# Patient Record
Sex: FEMALE | Race: WHITE
[De-identification: names, ages, dates, MRNs, and addresses within clinical notes are randomized per-mention and may not be internally consistent; named-entity substitution may affect disease eponyms.]

---

## 2019-09-11 ENCOUNTER — HOSPITAL ENCOUNTER (INPATIENT)
Dept: HOSPITAL 74 - YASAS | Age: 29
LOS: 3 days | Discharge: HOME | DRG: 773 | End: 2019-09-14
Attending: SURGERY | Admitting: SURGERY
Payer: COMMERCIAL

## 2019-09-11 VITALS — BODY MASS INDEX: 19.1 KG/M2

## 2019-09-11 DIAGNOSIS — Z86.69: ICD-10-CM

## 2019-09-11 DIAGNOSIS — F11.23: ICD-10-CM

## 2019-09-11 DIAGNOSIS — F13.230: ICD-10-CM

## 2019-09-11 DIAGNOSIS — F10.230: Primary | ICD-10-CM

## 2019-09-11 DIAGNOSIS — F17.210: ICD-10-CM

## 2019-09-11 DIAGNOSIS — F14.20: ICD-10-CM

## 2019-09-11 DIAGNOSIS — F34.1: ICD-10-CM

## 2019-09-11 PROCEDURE — HZ2ZZZZ DETOXIFICATION SERVICES FOR SUBSTANCE ABUSE TREATMENT: ICD-10-PCS | Performed by: ALLERGY & IMMUNOLOGY

## 2019-09-11 RX ADMIN — NICOTINE SCH: 21 PATCH TRANSDERMAL at 22:24

## 2019-09-11 RX ADMIN — Medication SCH: at 22:26

## 2019-09-11 NOTE — HP
<Mary Mayers - Last Filed: 19 18:54>





CIWA Score


Nausea/Vomitin-Mild Nausea/No Vomiting





- Admission Criteria


[facility] Guidelines: Admission for Medically Managed Detox: 


Requires at least one of the followin. CIWA greater than 12


2. Seizures within the past 24 hours


3. Delirium tremens within the past 24 hours


4. Hallucinations within the past 24 hours


5. Acute intervention needed for co  occurring medical disorder


6. Acute intervention needed for co  occurring psychiatric disorder


7. Severe withdrawal that cannot be handled at a lower level of care (continued


    vomiting, continued diarrhea, abnormal vital signs) requiring intravenous


    medication and/or fluids


8. Pregnancy








Admission ROS [facility]





- Hospitals in [place]


Chief Complaint: 








heroin detox








Allergies/Adverse Reactions: 


 Allergies











Allergy/AdvReac Type Severity Reaction Status Date / Time


 


No Known Allergies Allergy   Verified 19 17:14











History of Present Illness: 





poor historian. sedated.





Patient is a 30 yo F with no known PMHx, here for heroin detox. 


2-3 grams a day via IV.


Started heroin 2 years ago.


Was in a methadone program 5 months ago. 120mg.


never overdosed. carries a narcan pen with her.





uses cocaine every day via IV. can't quantify.





Drinks 2-3 pints of vodka a day.


Last drink at midnight.





unemployed. homeless.














- Ebola screening


Have you traveled outside of the country in the last 21 days: No (N)


Have you had contact with anyone from an Ebola affected area: No


Do you have a fever: No





Patient History





- Patient Medical History


Hx Anemia: No


Hx Asthma: No


Hx Chronic Obstructive Pulmonary Disease (COPD): No


Hx Cancer: No


Hx Cardiac Disorders: No


Hx Congestive Heart Failure: No


Hx Hypertension: No


Hx Hypercholesterolemia: No


Hx Pacemaker: No


HX Cerebrovascular Accident: No


Hx Seizures: Yes (etoh and drug related seizures/ last in )


Hx Dementia: No


Hx Diabetes: No


Hx Gastrointestinal Disorders: No


Hx Liver Disease: No


Hx Genitourinary Disorders: No


Hx Sexually Transmitted Disorders: No


Hx Renal Disease (ESRD): No


Hx Thyroid Disease: No


Hx Human Immunodeficiency Virus (HIV): No (NEGATIVE HX)


Hx Hepatitis C: No


Hx Depression: Yes


Hx Suicide Attempt: No


Hx Bipolar Disorder: No


Hx Schizophrenia: No





- Patient Surgical History


Past Surgical History: No





- PPD History


Date: 16


Results: 0 mm





- Reproductive History


Last Menstrual Period: 10/20/16





- Smoking Cessation


Smoking history: Current every day smoker


Have you smoked in the past 12 months: Yes


Aproximately how many cigarettes per day: 20


Cigars Per Day: 0


Hx Chewing Tobacco Use: No





- Substances abused


  ** Heroin


Substance route: Injection


Frequency: Daily


Amount used: 1 BRICK


Age of first use: 26


Date of last use: 19





  ** Cocaine


Substance route: Injection


Frequency: Daily


Amount used: $1000


Age of first use: 26


Date of last use: 19





  ** Alcohol


Substance route: Oral


Frequency: Daily


Amount used: 1 PINT OF HENESSEY AND PATRON


Age of first use: 26


Date of last use: 19





Family Disease History





- Family Disease History


Family Disease History: CA: Grandparent (GM-BREAST CA IN REMISSION), Respiratory

: Mother (ASTHMA), Other: Sister (HTN)





Admission Physical Exam [facility]





- Vital Signs


Vital Signs: 


 Vital Signs - 24 hr











  19





  17:09


 


Temperature 97.1 F L


 


Pulse Rate 58 L


 


Respiratory 20





Rate 


 


Blood Pressure 135/60














Breathalyzer





- Breathalyzer


Breathalyzer: 0





Urine Drug Screen





- Test Device


Lot number: [id number]


Expiration date: 21





- Control


Is test valid?: Yes





- Results


Drug screen NEGATIVE: No


Urine drug screen results: PALLAVI-Cocaine, FEN-Fentanyl, MOP-Opiates, OXY-Oxycodone





<Randell Castillo - Last Filed: 19 20:27>





COWS





- Scale


Resting Pulse: 0= [place] 80 or Below


Sweatin= Chills/Flushing


Restless Observation: 1= Difficult to Sit Still


Pupil Size: 1= Pupils >than Normal


Bone or Joint Aches: 2= Severe Diffuse Aches


Runny Nose/ Eye Tearin= Runny Nose/Eyes


GI Upset > 30mins: 1= Stomach Cramp


Tremor Observation: 2= Slight Tremor Visible


Yawning Observation: 1= 1-2x During Session


Anxiety or Irritability: 2=Irritable/Anxious


Goose Flesh Skin: 5=Prominent Piloerection


COWS Score: 18





CIWA Score


Nausea/Vomitin


Muscle Tremors: 4-Moderate,w/Arms Extend


Anxiety: 3


Agitation: 3


Paroxysmal Sweats: 1-Minimal Palms Moist


Orientation: 2-Disoriented Date<2 days


Tacttile Disturbances: 1-Very Mild Itch/Numbness


Auditory Disturbances: 1-Very Mild


Visual Disturbances: 2-Mild Sensitivity


Headache: 3-Moderate


CIWA-Ar Total Score: 22





- Admission Criteria


[facility] Guidelines: Admission for Medically Managed Detox: 


Requires at least one of the followin. CIWA greater than 12


2. Seizures within the past 24 hours


3. Delirium tremens within the past 24 hours


4. Hallucinations within the past 24 hours


5. Acute intervention needed for co  occurring medical disorder


6. Acute intervention needed for co  occurring psychiatric disorder


7. Severe withdrawal that cannot be handled at a lower level of care (continued


    vomiting, continued diarrhea, abnormal vital signs) requiring intravenous


    medication and/or fluids


8. Pregnancy





Patient presents the following: CIWA greater than 12


Admission Criteria Met: Admission criteria met





Admission ROS [facility]





- HPI


Chief Complaint: 


margarette desires detox


History of Present Illness: 


reports drinking henessey and patron most days of week





- Review of Systems


Constitutional: Malaise, Night Sweats, Unexplained wgt Loss


EENT: reports: See HPI, Tearing


Respiratory: reports: No Symptoms reported


Cardiac: reports: No Symptoms Reported


GI: reports: Nausea, Abdominal cramping


: reports: No Symptoms Reported


Musculoskeletal: reports: Muscle Pain


Integumentary: reports: No Symptoms Reported


Neuro: reports: No Symptoms reported


Endocrine: reports: No Symptoms Reported


Hematology: reports: No Symptoms Reported


Psychiatric: reports: Anxious





Patient History





- Patient Medical History


Hx Anemia: Yes


Hx Asthma: No


Hx Chronic Obstructive Pulmonary Disease (COPD): No


Hx Cancer: No


Hx Cardiac Disorders: No


Hx Congestive Heart Failure: No


Hx Hypertension: No


Hx Hypercholesterolemia: No


Hx Pacemaker: No


HX Cerebrovascular Accident: No


Hx Seizures: Yes


Hx Dementia: No


Hx Diabetes: No


Hx Gastrointestinal Disorders: No


Hx Liver Disease: No


Hx Genitourinary Disorders: No


Hx Sexually Transmitted Disorders: No


Hx Renal Disease (ESRD): No


Hx Thyroid Disease: No


Hx Human Immunodeficiency Virus (HIV): No


Hx Hepatitis C: No


Hx Depression: Yes


Hx Suicide Attempt: No


Hx Bipolar Disorder: No


Hx Schizophrenia: No





- Patient Surgical History


Other Surgical History: ho abcess drainage 





- PPD History


Previous Implant?: Yes





- Reproductive History


Patient is a Female of Child Bearing Age (11 -55 yrs old): Yes


Last Menstrual Period: 19





- Smoking Cessation


Smoking history: Current every day smoker


Have you smoked in the past 12 months: Yes


Initiated information on smoking cessation: Yes


'Breaking Loose' booklet given: 19





- Substance & Tx. History


Hx Alcohol Use: Yes


Hx Substance Use: Yes





- Substances abused





  ** Alcohol


Frequency: 3-6 times per week





Admission Physical Exam [facility]





- Vital Signs


Vital Signs: 


 Vital Signs - 24 hr











  19





  17:09


 


Temperature 97.1 F L


 


Pulse Rate 58 L


 


Respiratory 20





Rate 


 


Blood Pressure 135/60














- Physical


General Appearance: Yes: Disheveled, Irritable, Anxious


HEENTM: Yes: EOMI, Normocephalic


Respiratory: Yes: Within Normal Limits, Chest Non-Tender, Lungs Clear


Neck: Yes: No masses,lesions,Nodules


Breast: Yes: Breast Exam Deferred


Cardiology: Yes: Regular Rate, S1, S2


Abdominal: Yes: Normal Bowel Sounds, Non Tender


Genitourinary: Yes: Within Normal Limits


Back: Yes: Within Normal Limits, Normal Inspection


Musculoskeletal: Yes: Within Normal Limits, full range of Motion, Gait Steady


Extremities: Yes: Normal Capillary Refill, Normal Inspection, Normal Range of 

Motion, Non-Tender


Neurological: Yes: CNs II-XII NML intact, Alert, Motor Strength 5/5, Normal 

Response


Integumentary: Yes: Track Marks, Other (diffuse bl ue scarring)


Lymphatic: Yes: Within Normal Limits





- Diagnostic


(1) Alcohol dependence with uncomplicated withdrawal


Current Visit: Yes   Status: Acute   





(2) Opioid dependence with withdrawal


Current Visit: Yes   Status: Acute   





(3) Depression (emotion)


Current Visit: Yes   Status: Suspected   


Qualifiers: 


   Depression Type: dysthymia   Qualified Code(s): F34.1 - Dysthymic disorder   


Comment: insomnia   





(4) Drug withdrawal seizure


Current Visit: Yes   Status: Suspected   





Cleared for Admission [facility]





- Detox or Rehab


[facility] Level of Care: Medically Supervised





Inpatient Rehab Admission





- Rehab Decision to Admit


Inpatient rehab admission?: No
[FreeTextEntry1] : my legs bother me

## 2019-09-11 NOTE — PN
Teaching Attending Note


Name of Resident: Mary Mayers





ATTENDING PHYSICIAN STATEMENT





I saw and evaluated the patient.


I reviewed the resident's note and discussed the case with the resident.


I agree with the resident's findings and plan as documented.








SUBJECTIVE:


withdrawal





OBJECTIVE:cows/ciwa meet criteria








ASSESSMENT AND PLAN:


admit to detox


taper per proptoocol





Problem List





- Problems


(1) Alcohol dependence with uncomplicated withdrawal


Code(s): F10.230 - ALCOHOL DEPENDENCE WITH WITHDRAWAL, UNCOMPLICATED   





(2) Opioid dependence with withdrawal


Code(s): F11.23 - OPIOID DEPENDENCE WITH WITHDRAWAL   





(3) Depression (emotion)


Code(s): F32.9 - MAJOR DEPRESSIVE DISORDER, SINGLE EPISODE, UNSPECIFIED   


Qualifiers: 


   Depression Type: dysthymia   Qualified Code(s): F34.1 - Dysthymic disorder   





(4) Drug withdrawal seizure


Code(s): F19.239 - OTH PSYCHOACTIVE SUBSTANCE DEPENDENCE WITH WITHDRAWAL, UNSP; 

R56.9 - UNSPECIFIED CONVULSIONS

## 2019-09-12 LAB
ALBUMIN SERPL-MCNC: 3.1 G/DL (ref 3.4–5)
ALP SERPL-CCNC: 70 U/L (ref 45–117)
ALT SERPL-CCNC: 112 U/L (ref 13–61)
ANION GAP SERPL CALC-SCNC: 7 MMOL/L (ref 8–16)
AST SERPL-CCNC: 91 U/L (ref 15–37)
BILIRUB SERPL-MCNC: 0.4 MG/DL (ref 0.2–1)
BUN SERPL-MCNC: 8.3 MG/DL (ref 7–18)
CALCIUM SERPL-MCNC: 9.1 MG/DL (ref 8.5–10.1)
CHLORIDE SERPL-SCNC: 106 MMOL/L (ref 98–107)
CO2 SERPL-SCNC: 27 MMOL/L (ref 21–32)
CREAT SERPL-MCNC: 0.8 MG/DL (ref 0.55–1.3)
DEPRECATED RDW RBC AUTO: 15.9 % (ref 11.6–15.6)
GLUCOSE SERPL-MCNC: 104 MG/DL (ref 74–106)
HCT VFR BLD CALC: 40 % (ref 32.4–45.2)
HGB BLD-MCNC: 13.1 GM/DL (ref 10.7–15.3)
MCH RBC QN AUTO: 28.7 PG (ref 25.7–33.7)
MCHC RBC AUTO-ENTMCNC: 32.7 G/DL (ref 32–36)
MCV RBC: 87.6 FL (ref 80–96)
PLATELET # BLD AUTO: 304 K/MM3 (ref 134–434)
PMV BLD: 9.5 FL (ref 7.5–11.1)
POTASSIUM SERPLBLD-SCNC: 4.2 MMOL/L (ref 3.5–5.1)
PROT SERPL-MCNC: 6.8 G/DL (ref 6.4–8.2)
RBC # BLD AUTO: 4.57 M/MM3 (ref 3.6–5.2)
SODIUM SERPL-SCNC: 140 MMOL/L (ref 136–145)
WBC # BLD AUTO: 5.5 K/MM3 (ref 4–10)

## 2019-09-12 RX ADMIN — Medication PRN MG: at 21:25

## 2019-09-12 RX ADMIN — Medication SCH MG: at 21:23

## 2019-09-12 RX ADMIN — Medication SCH TAB: at 10:15

## 2019-09-12 RX ADMIN — NICOTINE SCH: 21 PATCH TRANSDERMAL at 10:15

## 2019-09-12 NOTE — PN
[facility] Progress Note


Note: 





alert,oriented x 3,methadone 30 mg detox first dose ordered for today

## 2019-09-12 NOTE — PN
S CIWA





- CIWA Score


Nausea/Vomitin


Muscle Tremors: 2


Anxiety: 2


Agitation: 2


Paroxysmal Sweats: 1-Minimal Palms Moist


Orientation: 0-Oriented


Tacttile Disturbances: 1-Very Mild Itch/Numbness


Auditory Disturbances: 0-None


Visual Disturbances: 0-None


Headache: 2-Mild


CIWA-Ar Total Score: 12





[facility] COWS





- Scale


Resting Pulse: 0= [place] 80 or Below


Sweatin= Chills/Flushing


Restless Observation: 1= Difficult to Sit Still


Pupil Size: 0= Normal to Room Light


Bone or Joint Aches: 1= Mild Discomfort


Runny Nose/ Eye Tearin= Nasal Congestion


GI Upset > 30mins: 1= Stomach Cramp


Tremor Observation of Outstretched Hands: 2= Slight Tremor Visible


Yawning Observation: 1= 1-2x During Session


Anxiety or Irritability: 2=Irritable/Anxious


Goose Flesh Skin: 0=Smooth Skin


COWS Score: 10





[facility]HS Progress Note (SOAP)


Subjective: 





alert,irritable,anxious,interrupted sleep,pain in the body and back


Objective: 





19 09:43


 Vital Signs











Temperature  97.9 F   19 09:15


 


Pulse Rate  70   19 09:15


 


Respiratory Rate  18   19 09:15


 


Blood Pressure  113/57 L  19 09:15


 


O2 Sat by Pulse Oximetry (%)      











19 09:43


 Laboratory Last Values











POC Urine HCG, Qual  Negative   19  18:12    








labs pending


Assessment: 





19 09:43


withdrawal symptom


Plan: 





continue detox methadone and librium regimen

## 2019-09-13 RX ADMIN — NICOTINE SCH: 21 PATCH TRANSDERMAL at 10:31

## 2019-09-13 RX ADMIN — Medication SCH MG: at 22:32

## 2019-09-13 RX ADMIN — Medication PRN MG: at 22:32

## 2019-09-13 RX ADMIN — Medication SCH TAB: at 10:29

## 2019-09-13 NOTE — PN
S CIWA





- CIWA Score


Nausea/Vomitin


Muscle Tremors: 2


Anxiety: 2


Agitation: 2


Paroxysmal Sweats: No Perspiration


Orientation: 0-Oriented


Tacttile Disturbances: 0-None


Auditory Disturbances: 0-None


Visual Disturbances: 0-None


Headache: 2-Mild


CIWA-Ar Total Score: 10





[facility]HS COWS





- Scale


Resting Pulse: 1= [place] 


Sweatin= Chills/Flushing


Restless Observation: 1= Difficult to Sit Still


Pupil Size: 1= Pupils >than Normal


Bone or Joint Aches: 1= Mild Discomfort


Runny Nose/ Eye Tearin= Nasal Congestion


GI Upset > 30mins: 1= Stomach Cramp


Tremor Observation of Outstretched Hands: 1= Tremor Felt, Not Seen


Yawning Observation: 1= 1-2x During Session


Anxiety or Irritability: 2=Irritable/Anxious


Goose Flesh Skin: 0=Smooth Skin


COWS Score: 11





BHS Progress Note (SOAP)


Subjective: 





alert,irritable,anxious,interrupted sleep,pain in the body and back


Objective: 





19 10:48


 Vital Signs











Temperature  98.4 F   19 09:22


 


Pulse Rate  94 H  19 09:22


 


Respiratory Rate  18   19 09:22


 


Blood Pressure  136/63   19 09:22


 


O2 Sat by Pulse Oximetry (%)      








 Laboratory Last Values











WBC  5.5 K/mm3 (4.0-10.0)   19  08:00    


 


RBC  4.57 M/mm3 (3.60-5.2)   19  08:00    


 


Hgb  13.1 GM/dL (10.7-15.3)   19  08:00    


 


Hct  40.0 % (32.4-45.2)   19  08:00    


 


MCV  87.6 fl (80-96)   19  08:00    


 


MCH  28.7 pg (25.7-33.7)   19  08:00    


 


MCHC  32.7 g/dl (32.0-36.0)   19  08:00    


 


RDW  15.9 % (11.6-15.6)  H D 19  08:00    


 


Plt Count  304 K/MM3 (134-434)  D 19  08:00    


 


MPV  9.5 fl (7.5-11.1)   19  08:00    


 


Sodium  140 mmol/L (136-145)   19  08:00    


 


Potassium  4.2 mmol/L (3.5-5.1)   19  08:00    


 


Chloride  106 mmol/L ()   19  08:00    


 


Carbon Dioxide  27 mmol/L (21-32)   19  08:00    


 


Anion Gap  7 MMOL/L (8-16)  L  19  08:00    


 


BUN  8.3 mg/dL (7-18)   19  08:00    


 


Creatinine  0.8 mg/dL (0.55-1.3)   19  08:00    


 


Est GFR (CKD-EPI)AfAm  116.28   19  08:00    


 


Est GFR (CKD-EPI)NonAf  100.33   19  08:00    


 


Random Glucose  104 mg/dL ()   19  08:00    


 


Calcium  9.1 mg/dL (8.5-10.1)   19  08:00    


 


Total Bilirubin  0.4 mg/dL (0.2-1)   19  08:00    


 


AST  91 U/L (15-37)  H  19  08:00    


 


ALT  112 U/L (13-61)  H  19  08:00    


 


Alkaline Phosphatase  70 U/L ()   19  08:00    


 


Total Protein  6.8 g/dl (6.4-8.2)   19  08:00    


 


Albumin  3.1 g/dl (3.4-5.0)  L  19  08:00    


 


POC Urine HCG, Qual  Negative   19  18:12    


 


RPR Titer  Nonreactive  (NONREACTIVE)   19  08:00    











Assessment: 





19 10:49


withdrawal symptom


Plan: 





continue detox methadone and librium regimen

## 2019-09-14 VITALS — DIASTOLIC BLOOD PRESSURE: 59 MMHG | SYSTOLIC BLOOD PRESSURE: 108 MMHG | HEART RATE: 65 BPM | TEMPERATURE: 98.5 F

## 2019-09-14 RX ADMIN — NICOTINE SCH: 21 PATCH TRANSDERMAL at 10:21

## 2019-09-14 RX ADMIN — Medication SCH TAB: at 10:21

## 2019-09-14 NOTE — DS
[facility] Detox Discharge Summary


Admission Date: 


09/11/19





Discharge Date: 09/14/19 (Left AMA)





- History


Present History: Alcohol Dependence, Cocaine Dependence, Opioid Dependence


Additional Comments: 





Pt left AMA, pt did not complete her detox protocol. Pt states she has to go 

and take care of something. An attempt to let pt stay and complete her protocol 

failed. Pt is encouraged to follow-up with [facility] outpatient program and also to 

follow-up with her PMD. Pt was adamant about information given. Pt is alert and 

oriented 3 and in no respiratory distress.


Pertinent Past History: 





h/o Heroin, cocaine, and alcohol use disorder.





- Physical Exam Results


Vital Signs: 


 Vital Signs











Temperature  98.5 F   09/14/19 09:11


 


Pulse Rate  65   09/14/19 09:11


 


Respiratory Rate  16   09/14/19 09:11


 


Blood Pressure  108/59 L  09/14/19 09:11


 


O2 Sat by Pulse Oximetry (%)      








 Vital Signs











  09/14/19 09/14/19





  06:56 09:11


 


Temperature 97 F L 98.5 F


 


Pulse Rate 66 65


 


Respiratory 18 16





Rate  


 


Blood Pressure 109/53 L 108/59 L








 Lab Results











WBC  5.5 K/mm3 (4.0-10.0)   09/12/19  08:00    


 


RBC  4.57 M/mm3 (3.60-5.2)   09/12/19  08:00    


 


Hgb  13.1 GM/dL (10.7-15.3)   09/12/19  08:00    


 


Hct  40.0 % (32.4-45.2)   09/12/19  08:00    


 


MCV  87.6 fl (80-96)   09/12/19  08:00    


 


MCHC  32.7 g/dl (32.0-36.0)   09/12/19  08:00    


 


RDW  15.9 % (11.6-15.6)  H D 09/12/19  08:00    


 


Plt Count  304 K/MM3 (134-434)  D 09/12/19  08:00    


 


Sodium  140 mmol/L (136-145)   09/12/19  08:00    


 


Potassium  4.2 mmol/L (3.5-5.1)   09/12/19  08:00    


 


Chloride  106 mmol/L ()   09/12/19  08:00    


 


Carbon Dioxide  27 mmol/L (21-32)   09/12/19  08:00    


 


Anion Gap  7 MMOL/L (8-16)  L  09/12/19  08:00    


 


BUN  8.3 mg/dL (7-18)   09/12/19  08:00    


 


Creatinine  0.8 mg/dL (0.55-1.3)   09/12/19  08:00    


 


Random Glucose  104 mg/dL ()   09/12/19  08:00    


 


Calcium  9.1 mg/dL (8.5-10.1)   09/12/19  08:00    








Labs noted.


Pertinent Admission Physical Exam Findings: 





Withdrawal symptoms.





- Treatment


Hospital Course: Detox Protocol Followed





- Medication


Discharge Medications: 


Ambulatory Orders





NK [No Known Home Medication]  09/21/16 











- Diagnosis


(1) Alcohol dependence with uncomplicated withdrawal


Status: Acute   





(2) Opioid dependence with withdrawal


Status: Acute   





(3) Sedative, hypnotic or anxiolytic dependence with withdrawal, uncomplicated


Status: Acute   





(4) Cocaine dependence


Status: Chronic   


Qualifiers: 


   Substance use status: uncomplicated   Qualified Code(s): F14.20 - Cocaine 

dependence, uncomplicated   





(5) Nicotine dependence


Status: Chronic   


Qualifiers: 


   Nicotine product type: cigarettes   Substance use status: uncomplicated   

Qualified Code(s): F17.210 - Nicotine dependence, cigarettes, uncomplicated   





- AMA


Did Patient Leave Against Medical Advice: Yes

## 2019-09-14 NOTE — PN
S CIWA





- CIWA Score


Nausea/Vomitin-No Nausea/No Vomiting


Muscle Tremors: None


Anxiety: 3


Agitation: 0-Normal Activity


Paroxysmal Sweats: 3


Orientation: 0-Oriented


Tacttile Disturbances: 0-None


Auditory Disturbances: 0-None


Visual Disturbances: 0-None


Headache: 2-Mild


CIWA-Ar Total Score: 8





[facility] COWS





- Scale


Resting Pulse: 0= [place] 80 or Below


Sweatin= Chills/Flushing


Restless Observation: 1= Difficult to Sit Still


Pupil Size: 0= Normal to Room Light


Bone or Joint Aches: 2= Severe Diffuse Aches


Runny Nose/ Eye Tearin= None


GI Upset > 30mins: 0= None


Tremor Observation of Outstretched Hands: 0= None


Yawning Observation: 1= 1-2x During Session


Anxiety or Irritability: 2=Irritable/Anxious


Goose Flesh Skin: 0=Smooth Skin


COWS Score: 7





[facility] Progress Note (SOAP)


Subjective: 





c/o headache, sweats, anxiety, muscle aches, and irritability.


Objective: 





19 10:35


 Vital Signs











  19





  03:30 06:56 09:11


 


Temperature  97 F L 98.5 F


 


Pulse Rate  66 65


 


Respiratory 18 18 16





Rate   


 


Blood Pressure  109/53 L 108/59 L








 Lab Results











WBC  5.5 K/mm3 (4.0-10.0)   19  08:00    


 


RBC  4.57 M/mm3 (3.60-5.2)   19  08:00    


 


Hgb  13.1 GM/dL (10.7-15.3)   19  08:00    


 


Hct  40.0 % (32.4-45.2)   19  08:00    


 


MCV  87.6 fl (80-96)   19  08:00    


 


MCHC  32.7 g/dl (32.0-36.0)   19  08:00    


 


RDW  15.9 % (11.6-15.6)  H D 19  08:00    


 


Plt Count  304 K/MM3 (134-434)  D 19  08:00    


 


Sodium  140 mmol/L (136-145)   19  08:00    


 


Potassium  4.2 mmol/L (3.5-5.1)   19  08:00    


 


Chloride  106 mmol/L ()   19  08:00    


 


Carbon Dioxide  27 mmol/L (21-32)   19  08:00    


 


Anion Gap  7 MMOL/L (8-16)  L  19  08:00    


 


BUN  8.3 mg/dL (7-18)   19  08:00    


 


Creatinine  0.8 mg/dL (0.55-1.3)   19  08:00    


 


Random Glucose  104 mg/dL ()   19  08:00    


 


Calcium  9.1 mg/dL (8.5-10.1)   19  08:00    








Labs noted.


Assessment: 





19 10:35


AOX3, in no acute respiratory distress.


Full ROM, ambulating in the unit.


Withdrawal symptoms.


Plan: 





continue detox.


Increase fluids.

## 2024-09-10 ENCOUNTER — HOSPITAL ENCOUNTER (INPATIENT)
Dept: HOSPITAL 74 - YASAS | Age: 34
LOS: 14 days | Discharge: HOME | DRG: 772 | End: 2024-09-24
Attending: PSYCHIATRY & NEUROLOGY | Admitting: PSYCHIATRY & NEUROLOGY
Payer: COMMERCIAL

## 2024-09-10 VITALS — BODY MASS INDEX: 29.1 KG/M2

## 2024-09-10 DIAGNOSIS — F17.210: ICD-10-CM

## 2024-09-10 DIAGNOSIS — N39.0: ICD-10-CM

## 2024-09-10 DIAGNOSIS — F41.9: ICD-10-CM

## 2024-09-10 DIAGNOSIS — B96.20: ICD-10-CM

## 2024-09-10 DIAGNOSIS — F11.20: ICD-10-CM

## 2024-09-10 DIAGNOSIS — F19.24: ICD-10-CM

## 2024-09-10 DIAGNOSIS — F34.1: ICD-10-CM

## 2024-09-10 DIAGNOSIS — K02.9: ICD-10-CM

## 2024-09-10 DIAGNOSIS — F14.20: Primary | ICD-10-CM

## 2024-09-10 DIAGNOSIS — F19.282: ICD-10-CM

## 2024-09-10 PROCEDURE — HZ42ZZZ GROUP COUNSELING FOR SUBSTANCE ABUSE TREATMENT, COGNITIVE-BEHAVIORAL: ICD-10-PCS | Performed by: PSYCHIATRY & NEUROLOGY

## 2024-09-10 RX ADMIN — Medication SCH MG: at 21:47

## 2024-09-10 RX ADMIN — Medication SCH MG: at 21:48

## 2024-09-10 RX ADMIN — HYDROXYZINE PAMOATE ONE MG: 25 CAPSULE ORAL at 21:48

## 2024-09-11 LAB
APPEARANCE UR: CLEAR
BACTERIA # UR AUTO: (no result) /UL (ref 0–1359)
BILIRUB UR STRIP.AUTO-MCNC: NEGATIVE MG/DL
CASTS URNS QL MICRO: 1 /UL (ref 0–3.1)
COLOR UR: YELLOW
EPITH CASTS URNS QL MICRO: 13 /UL (ref 0–25.1)
KETONES UR QL STRIP: NEGATIVE
LEUKOCYTE ESTERASE UR QL STRIP.AUTO: (no result)
NITRITE UR QL STRIP: POSITIVE
PH UR: 7.5 [PH] (ref 5–8)
PROT UR QL STRIP: NEGATIVE
PROT UR QL STRIP: NEGATIVE
RBC # BLD AUTO: 7 /UL (ref 0–23.9)
SP GR UR: 1.01 (ref 1.01–1.03)
UROBILINOGEN UR STRIP-MCNC: 0.2 MG/DL (ref 0.2–1)
WBC # UR AUTO: 332 /UL (ref 0–25.8)

## 2024-09-11 RX ADMIN — HYDROXYZINE PAMOATE PRN MG: 25 CAPSULE ORAL at 15:12

## 2024-09-11 RX ADMIN — Medication SCH TAB: at 09:17

## 2024-09-11 RX ADMIN — TUBERCULIN PURIFIED PROTEIN DERIVATIVE ONE TU: 5 INJECTION, SOLUTION INTRADERMAL at 09:18

## 2024-09-11 RX ADMIN — TRAZODONE HYDROCHLORIDE SCH MG: 50 TABLET ORAL at 21:22

## 2024-09-11 RX ADMIN — METHOCARBAMOL PRN MG: 500 TABLET ORAL at 11:08

## 2024-09-12 RX ADMIN — ACETAMINOPHEN PRN MG: 325 TABLET ORAL at 17:50

## 2024-09-13 RX ADMIN — IBUPROFEN PRN MG: 600 TABLET, FILM COATED ORAL at 14:09

## 2024-09-15 VITALS — RESPIRATION RATE: 18 BRPM

## 2024-09-16 RX ADMIN — NICOTINE SCH MG: 21 PATCH TRANSDERMAL at 13:27

## 2024-09-16 RX ADMIN — BACLOFEN SCH MG: 10 TABLET ORAL at 21:13

## 2024-09-16 RX ADMIN — HYDROXYZINE PAMOATE PRN MG: 25 CAPSULE ORAL at 16:44

## 2024-09-16 RX ADMIN — BACITRACIN ZINC SCH GM: 500 OINTMENT TOPICAL at 13:27

## 2024-09-16 RX ADMIN — TRAZODONE HYDROCHLORIDE SCH MG: 50 TABLET ORAL at 21:13

## 2024-09-17 LAB
APPEARANCE UR: (no result)
BACTERIA # UR AUTO: 7184 /UL (ref 0–1359)
BILIRUB UR STRIP.AUTO-MCNC: NEGATIVE MG/DL
CASTS URNS QL MICRO: 0 /UL (ref 0–3.1)
COLOR UR: YELLOW
EPITH CASTS URNS QL MICRO: 7 /UL (ref 0–25.1)
KETONES UR QL STRIP: NEGATIVE
LEUKOCYTE ESTERASE UR QL STRIP.AUTO: NEGATIVE
NITRITE UR QL STRIP: POSITIVE
PH UR: 7 [PH] (ref 5–8)
PROT UR QL STRIP: NEGATIVE
PROT UR QL STRIP: NEGATIVE
RBC # BLD AUTO: 3 /UL (ref 0–23.9)
SP GR UR: 1.02 (ref 1.01–1.03)
UROBILINOGEN UR STRIP-MCNC: 1 MG/DL (ref 0.2–1)
WBC # UR AUTO: 12 /UL (ref 0–25.8)

## 2024-09-18 RX ADMIN — SULFAMETHOXAZOLE AND TRIMETHOPRIM SCH EACH: 800; 160 TABLET ORAL at 14:29

## 2024-09-19 RX ADMIN — BENZOCAINE PRN APPLIC: 200 GEL TOPICAL at 21:28

## 2024-09-24 VITALS — DIASTOLIC BLOOD PRESSURE: 63 MMHG | TEMPERATURE: 98 F | SYSTOLIC BLOOD PRESSURE: 102 MMHG | HEART RATE: 90 BPM
